# Patient Record
Sex: FEMALE | Race: WHITE | NOT HISPANIC OR LATINO | Employment: OTHER
[De-identification: names, ages, dates, MRNs, and addresses within clinical notes are randomized per-mention and may not be internally consistent; named-entity substitution may affect disease eponyms.]

---

## 2022-11-11 ENCOUNTER — TRANSCRIBE ORDERS (OUTPATIENT)
Dept: OTHER | Age: 72
End: 2022-11-11

## 2022-11-11 DIAGNOSIS — Z86.73 HISTORY OF STROKE: Primary | ICD-10-CM

## 2023-04-03 ENCOUNTER — OFFICE VISIT (OUTPATIENT)
Dept: NEUROLOGY | Facility: CLINIC | Age: 73
End: 2023-04-03
Attending: FAMILY MEDICINE
Payer: COMMERCIAL

## 2023-04-03 VITALS
HEART RATE: 66 BPM | OXYGEN SATURATION: 96 % | DIASTOLIC BLOOD PRESSURE: 84 MMHG | BODY MASS INDEX: 25.27 KG/M2 | HEIGHT: 64 IN | SYSTOLIC BLOOD PRESSURE: 144 MMHG | WEIGHT: 148 LBS

## 2023-04-03 DIAGNOSIS — I69.30 LATE EFFECT OF ISCHEMIC CEREBRAL STROKE: ICD-10-CM

## 2023-04-03 DIAGNOSIS — Z82.49 FAMILY HISTORY OF DVT: ICD-10-CM

## 2023-04-03 DIAGNOSIS — D66 CONGENITAL FACTOR VIII DISORDER (H): ICD-10-CM

## 2023-04-03 DIAGNOSIS — E78.5 DYSLIPIDEMIA: ICD-10-CM

## 2023-04-03 DIAGNOSIS — Z86.73 HISTORY OF STROKE: ICD-10-CM

## 2023-04-03 DIAGNOSIS — Z79.01 ON CONTINUOUS ORAL ANTICOAGULATION: ICD-10-CM

## 2023-04-03 DIAGNOSIS — R42 DIZZY SPELLS: Primary | ICD-10-CM

## 2023-04-03 DIAGNOSIS — R03.0 ELEVATED BLOOD PRESSURE READING WITHOUT DIAGNOSIS OF HYPERTENSION: ICD-10-CM

## 2023-04-03 PROCEDURE — 99205 OFFICE O/P NEW HI 60 MIN: CPT | Performed by: PSYCHIATRY & NEUROLOGY

## 2023-04-03 RX ORDER — ATORVASTATIN CALCIUM 10 MG/1
1 TABLET, FILM COATED ORAL DAILY
COMMUNITY
Start: 2022-08-08

## 2023-04-03 RX ORDER — VITAMIN B COMPLEX
1000 TABLET ORAL
COMMUNITY

## 2023-04-03 RX ORDER — WARFARIN SODIUM 5 MG/1
TABLET ORAL
COMMUNITY
Start: 2022-07-21

## 2023-04-03 RX ORDER — ACETAMINOPHEN 500 MG
1000 TABLET ORAL
COMMUNITY

## 2023-04-03 RX ORDER — ESCITALOPRAM OXALATE 10 MG/1
1 TABLET ORAL DAILY
COMMUNITY
Start: 2022-05-24

## 2023-04-03 RX ORDER — ALENDRONATE SODIUM 70 MG/1
70 TABLET ORAL
COMMUNITY
Start: 2023-02-15

## 2023-04-03 NOTE — NURSING NOTE
"Miriam Jaramillo is a 72 year old female who presents for:  Chief Complaint   Patient presents with     Stroke        Initial Vitals:  BP (!) 144/84   Pulse 66   Ht 5' 4\" (1.626 m)   Wt 148 lb (67.1 kg)   SpO2 96%   BMI 25.40 kg/m   Estimated body mass index is 25.4 kg/m  as calculated from the following:    Height as of this encounter: 5' 4\" (1.626 m).    Weight as of this encounter: 148 lb (67.1 kg).. Body surface area is 1.74 meters squared. BP completed using cuff size: gary Pineda    "

## 2023-04-03 NOTE — PATIENT INSTRUCTIONS
Diagnosis:   History of stroke in young age and multiple other strokes found incidentally on brain imaging. We assume you have a blood clotting disease because you have high level factor VIII and family history of blood clots. You have been on warfarin for 3 years with good response.   Recently you have developed episodes of dizziness/blurriness when you turn too quickly. I want to make sure that you did not have a new stroke.   Your blood pressure is a bit high in the clinic. You do not have hypertension diagnosis. It is important to make sure blood pressure is well controlled not only because it is the most important risk factor for stroke but also because you are on a blood thinner and at risk for hemorrhage.     Plan:   MRI brain and MRA head and neck.   Continue warfarin. Your target INR is 2.0-3.0.   Check cholesterol level.   I will call you with the results.   I want you to call your primary doctor and get a prescription for a blood pressure home device. Check your blood pressure 1-2 times a day. Keep record of the numbers so that I and your primary can take a look at them and make sure that they are good. Your target blood pressure is less than 130/80.     If the MRI and the MRA come back fine then follow up in 1 year. If there are any concerns, I will make arrangements to bring you back to the clinic soon.     Antolin Fitzgerald MD, Msc, CARTER, FAAN   of Neurology  [facility]     04/03/2023 12:50 PM

## 2023-04-03 NOTE — PROGRESS NOTES
"  _____________________________________________________________    [facility]    at [facility] [place] [phone]  _____________________________________________________________      Chief Complaint: Dizziness    History of Present Illness: Miriam Jaramillo is a 72 year old female presenting as a new patient for dizziness. She used to be my patient at my old job at [employer]. The last time I saw her was in December 2020. My note from that visit summarizes her neurological history and I will copy it here:   \"h/o multiple strokes starting at the age of 48. Clinically she only had symptoms from the first stroke only but based on the MRI, we can see   -large right MCA territory encephalomalacia   -left subcortical white matter small encephalomalacia   -left small medial occipital cortical encephalomalacia   -at least 2 small right occipital old infarcts plus the recent subacute one which is also small in size     Stroke mechanism is unknown. She does not have typical risk factors such as DM, HTN, PVD, CAD, etc. Prior cardiac workup including MATY was unremarkable. The only suspicious finding is that the patient has elevated factor VIII which is a thrombophilic condition usually associated with venous thromboses. She had been on estrogen before her first stroke but off of it since then. Strokes are of various sizes and locations so possible that she has more than one mechanism.      She was seen by rheumatology and hematology in 2008 for fatigue and lymphadenopathy. Had extensive hematologic workup including anticardiolipin antibodies, antithrombin III level, factor VIII, factor II mutation, factor V Leiden mutation, and lupus anticoagulant workup, protein C, protein S activity, serum viscosity, ESR, serum ELP and immunofixation. All within normal limits except factor VIII which was elevated (220%).     When I saw her in clinic in April 2019, I recommended MRA head and neck, " "repeat TTE, and 30d cardiac monitoring. her MRA showed no stenosis or occlusion and her cardiac monitoring was negative for A FIB. TTE was negative for PFO. Her neuro exam has been non focal.     Last time I saw her was in Nov 2019. At that visit, and given the negative workup and the history of her brother having had recurrent DVTs, I recommended anticoagulation with warfarin and a follow up MRI in 1 year.     SUBJECTIVE:  Patient is here for follow up. She is unaccompanied.   No stroke or TIA symptoms since I last saw her.   Had mild headache on and off last 6-7 months. Headache is mainly in the right occipital area and moves up towards the top of the head. Right side only. The sensation over the scalp in that area feels different. hypersensitive to touch.   Also neck pain and stiffness last 6-7 months. Doing PT twice a week for it.   Easy bruising. Mild. Mainly in the dorsum of the hands.   Appetite is \"too good\" but no change in weight.\"   Her neurological examination at that visit was normal with NIHSS of 0.     The patient has not seen a neurologist since she last saw me. She requested this appointment so that I can continue to follow her case. She is here today unaccompanied. She continues to live alone with her dog. Overall, she feels that body in decline over the years. Does not have the muscles she had 2 years ago. Walking less and when tries to walk the same amount she gets exhausted. She feels left side weaker than the right especially when exhausted. But it is also the side where she has hip pain, especially in the morning or after prolonged sitting, which is why she prefers to stand at work (works at "[employer] 2 hours a day twice a week). She has also noticed that her memory is starting to decline especially the \"ultra-short term memory\" but this has not impacted her functioning.     When asked about stroke like symptoms, she indicated that she has been having recurrent episodes of momentary dizziness and " "blurring with head/body turning or bending over. These episodes started a month ago. No double vision. No hearing loss. Occasional ringing in the left ear (couple of years).     Continues on warfarin. Most recent today is 2.3. No excessive bleeding. In fact, she has less bleeding on warfarin than she used to have on plavix.     Does not check BP at home. Always been \"good\" but she has noticed recently higher readings when she goes to doctors.         Diagnosis:  Problem List Items Addressed This Visit    None  Visit Diagnoses     Dizzy spells    -  Primary    Relevant Orders    MR Brain w/o & w Contrast    MRA Brain ([place]) w Contrast    MRA Neck (Carotids) wo & w Contrast    History of stroke        Relevant Orders    Lipid panel reflex to direct LDL Fasting    Late effect of ischemic cerebral stroke        Relevant Orders    MR Brain w/o & w Contrast    MRA Brain ([place]) w Contrast    MRA Neck (Carotids) wo & w Contrast    Elevated blood pressure reading without diagnosis of hypertension        Dyslipidemia        Relevant Medications    atorvastatin (LIPITOR) 10 MG tablet    Other Relevant Orders    Lipid panel reflex to direct LDL Fasting    Congenital factor VIII disorder (H)        Relevant Orders    MR Brain w/o & w Contrast    MRA Brain ([place]) w Contrast    MRA Neck (Carotids) wo & w Contrast    Family history of DVT        On continuous oral anticoagulation              Impression & Plan:     As discussed with the patient    Diagnosis:   History of stroke in young age and multiple other strokes found incidentally on brain imaging. We assume you have a blood clotting disease because you have high level factor VIII and family history of blood clots. You have been on warfarin for 3 years with good response.   Recently you have developed episodes of dizziness/blurriness when you turn too quickly. I want to make sure that you did not have a new stroke.   Your blood pressure is a " "bit high in the clinic. You do not have hypertension diagnosis. It is important to make sure blood pressure is well controlled not only because it is the most important risk factor for stroke but also because you are on a blood thinner and at risk for hemorrhage.     Plan:   MRI brain and MRA head and neck.   Continue warfarin. Your target INR is 2.0-3.0.   Check cholesterol level.   I will call you with the results.   I want you to call your primary doctor and get a prescription for a blood pressure home device. Check your blood pressure 1-2 times a day. Keep record of the numbers so that I and your primary can take a look at them and make sure that they are good. Your target blood pressure is less than 130/80.     If the MRI and the MRA come back fine then follow up in 1 year. If there are any concerns, I will make arrangements to bring you back to the clinic soon.     Stroke Education provided.  She will call us with any questions.  For any acute neurologic deficits she was advised to  go directly to the hospital rather than call the clinic.    Antolin Fitzgerald MD, Msc, CARTER, SUZIN   of Neurology  [facility]     04/03/2023 11:10 AM  To page me or covering stroke neurology team member, click here: AMCOM  Choose \"On Call\" tab at top, then search dropdown box for \"Neurology Adult\" & press Enter, look for Neuro ICU/Stroke    ___________________________________________________________________    Current Medications  No current outpatient medications on file.     No current facility-administered medications for this visit.       Past Medical History  Past Medical History:   Diagnosis Date     Adjustment disorder with depressed mood     No Comments Provided     Cerebral artery occlusion with cerebral infarction (H)     1999,2 of them; while running     Disorder of bone and cartilage     DEXA 5/06 T -2.19 L spine, -2.14 hip, -4.6 wrist     Drusen (degenerative) of retina     No Comments " "Provided     Enlarged lymph nodes     08     Enthesopathy of ankle and tarsus     No Comments Provided     Intestinal infection due to Clostridium difficile     DENIES     Lumbosacral spondylosis without myelopathy     No Comments Provided     Macular cyst, hole, or pseudohole of retina     ,right     Major depressive disorder, single episode     No Comments Provided     Mitral valve disorder     mild with mild aortic sclerosis echo      Osteoporosis     No Comments Provided     Other emphysema (H)     very mild     Pseudoexfoliation of lens capsule     No Comments Provided     Raynaud's syndrome     No Comments Provided     Urge incontinence     No Comments Provided       Social History  Social History     Tobacco Use     Smoking status: Former     Types: Cigarettes     Quit date: 1980     Years since quittin.3     Smokeless tobacco: Never   Substance Use Topics     Alcohol use: No     Drug use: Unknown     Types: Other     Comment: Drug use: No       Family History  History of recurrent DVT in her brother.       Physical Exam    BP (!) 144/84   Pulse 66   Ht 1.626 m (5' 4\")   Wt 67.1 kg (148 lb)   SpO2 96%   BMI 25.40 kg/m      General Exam:  GEN:  Awake, NAD  HEAD:  Atraumatic/Normocephalic  EYES:  Non-icterus, no conjunctivitis  EARS:  No otic discharge  NOSE:  Patent nares, no discharge  THROAT:  MMM, No oral lesions  PULM:  Breathing comfortably on room air, no tachypnea, not using accessory muscles of respiration  MSK:  No peripheral edema     NEUROLOGICAL EXAM: , left hemihypesthesia   Mental State:   Awake, Oriented to time, place, and persons. Able to recall 2 out of 3 items after a distraction task, able to follow trail B up to 9i then made a mistake, able to spell \"world\" forward but made 1 mistake world backward. No extinction or neglect. Reactive affect.   LANGUAGE/SPEECH:    No dysphasia or paraphasic errors.    CN:   I:  Deferred  II:  VFF  III/IV/VI: EOMI, no " nystagmus  V:  decreased pinprick sensation on the left.   VII:  Face was symmetric bilaterally  VIII:  Hearing was intact to conversational voice  IX/X:  Palatal raise was intact bilaterally  XI:  Shoulder shrug was strong bilaterally; Pt is able to rotate head left and right against resistance  XII:  Tongue midline; able to move it left and right without any difficulties  MOTOR:  State and Bulk:  No atrophy, hypertrophy, or fasciculations.   Tone:  Normal in bilateral UE and LE  Dexterity and speed: Intact  Power: no focal weakness, 5/5 in all muscle groups  Sensory:   Mild left hemihypesthesia  Coordination:  Intact finger to nose and heel to shin bilaterally; no dysmetria or decomposition of movement  Involuntary Movement:    None observed  Gait:    Antalgic gait         Labs:  Most recent LDL was 88 in 2019.     Billing disclosure:    60 min spent on the date of the encounter in chart review, patient visit, review of tests, documentation and/or discussion with other providers about the issues documented above.

## 2023-04-03 NOTE — LETTER
"    4/3/2023         RE: Miriam Jaramillo  [address] [facility] [address]        Dear Colleague,    Thank you for referring your patient, Miriam Jaramillo, to the [facility]. Please see a copy of my visit note below.      _____________________________________________________________    [facility]    at [facility] [phone]  _____________________________________________________________      Chief Complaint: Dizziness    History of Present Illness: Miriam Jaramillo is a 72 year old female presenting as a new patient for dizziness. She used to be my patient at my old job at [employer]. The last time I saw her was in December 2020. My note from that visit summarizes her neurological history and I will copy it here:   \"h/o multiple strokes starting at the age of 48. Clinically she only had symptoms from the first stroke only but based on the MRI, we can see   -large right MCA territory encephalomalacia   -left subcortical white matter small encephalomalacia   -left small medial occipital cortical encephalomalacia   -at least 2 small right occipital old infarcts plus the recent subacute one which is also small in size     Stroke mechanism is unknown. She does not have typical risk factors such as DM, HTN, PVD, CAD, etc. Prior cardiac workup including MATY was unremarkable. The only suspicious finding is that the patient has elevated factor VIII which is a thrombophilic condition usually associated with venous thromboses. She had been on estrogen before her first stroke but off of it since then. Strokes are of various sizes and locations so possible that she has more than one mechanism.      She was seen by rheumatology and hematology in 2008 for fatigue and lymphadenopathy. Had extensive hematologic workup including anticardiolipin antibodies, antithrombin III level, factor VIII, factor II mutation, factor V Leiden " "mutation, and lupus anticoagulant workup, protein C, protein S activity, serum viscosity, ESR, serum ELP and immunofixation. All within normal limits except factor VIII which was elevated (220%).     When I saw her in clinic in April 2019, I recommended MRA head and neck, repeat TTE, and 30d cardiac monitoring. her MRA showed no stenosis or occlusion and her cardiac monitoring was negative for A FIB. TTE was negative for PFO. Her neuro exam has been non focal.     Last time I saw her was in Nov 2019. At that visit, and given the negative workup and the history of her brother having had recurrent DVTs, I recommended anticoagulation with warfarin and a follow up MRI in 1 year.     SUBJECTIVE:  Patient is here for follow up. She is unaccompanied.   No stroke or TIA symptoms since I last saw her.   Had mild headache on and off last 6-7 months. Headache is mainly in the right occipital area and moves up towards the top of the head. Right side only. The sensation over the scalp in that area feels different. hypersensitive to touch.   Also neck pain and stiffness last 6-7 months. Doing PT twice a week for it.   Easy bruising. Mild. Mainly in the dorsum of the hands.   Appetite is \"too good\" but no change in weight.\"   Her neurological examination at that visit was normal with NIHSS of 0.     The patient has not seen a neurologist since she last saw me. She requested this appointment so that I can continue to follow her case. She is here today unaccompanied. She continues to live alone with her dog. Overall, she feels that body in decline over the years. Does not have the muscles she had 2 years ago. Walking less and when tries to walk the same amount she gets exhausted. She feels left side weaker than the right especially when exhausted. But it is also the side where she has hip pain, especially in the morning or after prolonged sitting, which is why she prefers to stand at work (works at [employer] 2 hours a day twice a week). " "She has also noticed that her memory is starting to decline especially the \"ultra-short term memory\" but this has not impacted her functioning.     When asked about stroke like symptoms, she indicated that she has been having recurrent episodes of momentary dizziness and blurring with head/body turning or bending over. These episodes started a month ago. No double vision. No hearing loss. Occasional ringing in the left ear (couple of years).     Continues on warfarin. Most recent today is 2.3. No excessive bleeding. In fact, she has less bleeding on warfarin than she used to have on plavix.     Does not check BP at home. Always been \"good\" but she has noticed recently higher readings when she goes to doctors.         Diagnosis:  Problem List Items Addressed This Visit    None  Visit Diagnoses     Dizzy spells    -  Primary    Relevant Orders    MR Brain w/o & w Contrast    MRA Brain ([place]) w Contrast    MRA Neck ([place]) wo & w Contrast    History of stroke        Relevant Orders    Lipid panel reflex to direct LDL Fasting    Late effect of ischemic cerebral stroke        Relevant Orders    MR Brain w/o & w Contrast    MRA Brain ([place]) w Contrast    MRA Neck (Car[place]) wo & w Contrast    Elevated blood pressure reading without diagnosis of hypertension        Dyslipidemia        Relevant Medications    atorvastatin (LIPITOR) 10 MG tablet    Other Relevant Orders    Lipid panel reflex to direct LDL Fasting    Congenital factor VIII disorder (H)        Relevant Orders    MR Brain w/o & w Contrast    MRA Brain ([place]) w Contrast    MRA Neck (Car[place]) wo & w Contrast    Family history of DVT        On continuous oral anticoagulation              Impression & Plan:     As discussed with the patient    Diagnosis:   History of stroke in young age and multiple other strokes found incidentally on brain imaging. We assume you have a blood clotting disease because you have high level " "factor VIII and family history of blood clots. You have been on warfarin for 3 years with good response.   Recently you have developed episodes of dizziness/blurriness when you turn too quickly. I want to make sure that you did not have a new stroke.   Your blood pressure is a bit high in the clinic. You do not have hypertension diagnosis. It is important to make sure blood pressure is well controlled not only because it is the most important risk factor for stroke but also because you are on a blood thinner and at risk for hemorrhage.     Plan:   MRI brain and MRA head and neck.   Continue warfarin. Your target INR is 2.0-3.0.   Check cholesterol level.   I will call you with the results.   I want you to call your primary doctor and get a prescription for a blood pressure home device. Check your blood pressure 1-2 times a day. Keep record of the numbers so that I and your primary can take a look at them and make sure that they are good. Your target blood pressure is less than 130/80.     If the MRI and the MRA come back fine then follow up in 1 year. If there are any concerns, I will make arrangements to bring you back to the clinic soon.     Stroke Education provided.  She will call us with any questions.  For any acute neurologic deficits she was advised to  go directly to the hospital rather than call the clinic.    Antolin Fitzgerald MD, Msc, [facility]   of Neurology  [facility]     04/03/2023 11:10 AM  To page me or covering stroke neurology team member, click here: AMCOM  Choose \"On Call\" tab at top, then search dropdown box for \"Neurology Adult\" & press Enter, look for Neuro ICU/Stroke    ___________________________________________________________________    Current Medications  No current outpatient medications on file.     No current facility-administered medications for this visit.       Past Medical History  Past Medical History:   Diagnosis Date     Adjustment disorder " "with depressed mood     No Comments Provided     Cerebral artery occlusion with cerebral infarction (H)     ,2 of them; while running     Disorder of bone and cartilage     DEXA  T -2.19 L spine, -2.14 hip, -4.6 wrist     Drusen (degenerative) of retina     No Comments Provided     Enlarged lymph nodes     08     Enthesopathy of ankle and tarsus     No Comments Provided     Intestinal infection due to Clostridium difficile     DENIES     Lumbosacral spondylosis without myelopathy     No Comments Provided     Macular cyst, hole, or pseudohole of retina     ,right     Major depressive disorder, single episode     No Comments Provided     Mitral valve disorder     mild with mild aortic sclerosis echo      Osteoporosis     No Comments Provided     Other emphysema (H)     very mild     Pseudoexfoliation of lens capsule     No Comments Provided     Raynaud's syndrome     No Comments Provided     Urge incontinence     No Comments Provided       Social History  Social History     Tobacco Use     Smoking status: Former     Types: Cigarettes     Quit date: 1980     Years since quittin.3     Smokeless tobacco: Never   Substance Use Topics     Alcohol use: No     Drug use: Unknown     Types: Other     Comment: Drug use: No       Family History  History of recurrent DVT in her brother.       Physical Exam    BP (!) 144/84   Pulse 66   Ht 1.626 m (5' 4\")   Wt 67.1 kg (148 lb)   SpO2 96%   BMI 25.40 kg/m      General Exam:  GEN:  Awake, NAD  HEAD:  Atraumatic/Normocephalic  EYES:  Non-icterus, no conjunctivitis  EARS:  No otic discharge  NOSE:  Patent nares, no discharge  THROAT:  MMM, No oral lesions  PULM:  Breathing comfortably on room air, no tachypnea, not using accessory muscles of respiration  MSK:  No peripheral edema     NEUROLOGICAL EXAM: , left hemihypesthesia   Mental State:   Awake, Oriented to time, place, and persons. Able to recall 2 out of 3 items after a distraction task, able " "to follow trail B up to 9i then made a mistake, able to spell \"world\" forward but made 1 mistake world backward. No extinction or neglect. Reactive affect.   LANGUAGE/SPEECH:    No dysphasia or paraphasic errors.    CN:   I:  Deferred  II:  VFF  III/IV/VI: EOMI, no nystagmus  V:  decreased pinprick sensation on the left.   VII:  Face was symmetric bilaterally  VIII:  Hearing was intact to conversational voice  IX/X:  Palatal raise was intact bilaterally  XI:  Shoulder shrug was strong bilaterally; Pt is able to rotate head left and right against resistance  XII:  Tongue midline; able to move it left and right without any difficulties  MOTOR:  State and Bulk:  No atrophy, hypertrophy, or fasciculations.   Tone:  Normal in bilateral UE and LE  Dexterity and speed: Intact  Power: no focal weakness, 5/5 in all muscle groups  Sensory:   Mild left hemihypesthesia  Coordination:  Intact finger to nose and heel to shin bilaterally; no dysmetria or decomposition of movement  Involuntary Movement:    None observed  Gait:    Antalgic gait         Labs:  Most recent LDL was 88 in 2019.     Billing disclosure:    60 min spent on the date of the encounter in chart review, patient visit, review of tests, documentation and/or discussion with other providers about the issues documented above.           Again, thank you for allowing me to participate in the care of your patient.        Sincerely,        Antolin Fitzgerald MD    "

## 2023-04-10 ENCOUNTER — HOSPITAL ENCOUNTER (OUTPATIENT)
Dept: MRI IMAGING | Facility: HOSPITAL | Age: 73
Discharge: HOME | End: 2023-04-10
Attending: PSYCHIATRY & NEUROLOGY
Payer: COMMERCIAL

## 2023-04-10 DIAGNOSIS — D66 CONGENITAL FACTOR VIII DISORDER (H): ICD-10-CM

## 2023-04-10 DIAGNOSIS — R42 DIZZY SPELLS: ICD-10-CM

## 2023-04-10 DIAGNOSIS — I69.30 LATE EFFECT OF ISCHEMIC CEREBRAL STROKE: ICD-10-CM

## 2023-04-10 PROCEDURE — 70549 MR ANGIOGRAPH NECK W/O&W/DYE: CPT

## 2023-04-10 PROCEDURE — 255N000002 HC RX 255 OP 636: Performed by: PSYCHIATRY & NEUROLOGY

## 2023-04-10 PROCEDURE — A9585 GADOBUTROL INJECTION: HCPCS | Performed by: PSYCHIATRY & NEUROLOGY

## 2023-04-10 PROCEDURE — 70544 MR ANGIOGRAPHY HEAD W/O DYE: CPT

## 2023-04-10 RX ORDER — GADOBUTROL 604.72 MG/ML
0.1 INJECTION INTRAVENOUS ONCE
Status: COMPLETED | OUTPATIENT
Start: 2023-04-10 | End: 2023-04-10

## 2023-04-10 RX ADMIN — GADOBUTROL 7 ML: 604.72 INJECTION INTRAVENOUS at 15:58

## 2023-04-11 ENCOUNTER — HOSPITAL ENCOUNTER (OUTPATIENT)
Dept: MRI IMAGING | Facility: HOSPITAL | Age: 73
Discharge: HOME | End: 2023-04-11
Attending: PSYCHIATRY & NEUROLOGY | Admitting: PSYCHIATRY & NEUROLOGY
Payer: COMMERCIAL

## 2023-04-11 DIAGNOSIS — I69.30 LATE EFFECT OF ISCHEMIC CEREBRAL STROKE: ICD-10-CM

## 2023-04-11 DIAGNOSIS — R42 DIZZY SPELLS: ICD-10-CM

## 2023-04-11 DIAGNOSIS — D66 CONGENITAL FACTOR VIII DISORDER (H): ICD-10-CM

## 2023-04-11 PROCEDURE — A9585 GADOBUTROL INJECTION: HCPCS | Performed by: PSYCHIATRY & NEUROLOGY

## 2023-04-11 PROCEDURE — 70553 MRI BRAIN STEM W/O & W/DYE: CPT

## 2023-04-11 PROCEDURE — 255N000002 HC RX 255 OP 636: Performed by: PSYCHIATRY & NEUROLOGY

## 2023-04-11 RX ORDER — GADOBUTROL 604.72 MG/ML
0.1 INJECTION INTRAVENOUS ONCE
Status: COMPLETED | OUTPATIENT
Start: 2023-04-11 | End: 2023-04-11

## 2023-04-11 RX ADMIN — GADOBUTROL 7 ML: 604.72 INJECTION INTRAVENOUS at 15:38
